# Patient Record
Sex: FEMALE | Race: WHITE
[De-identification: names, ages, dates, MRNs, and addresses within clinical notes are randomized per-mention and may not be internally consistent; named-entity substitution may affect disease eponyms.]

---

## 2019-08-01 NOTE — RS.OPPTDN
Subjective


Date of Note: 08/01/19


Visit #: 3


Number of visits approved by Insurance: na


Date of Evaluation: 07/25/19


Payer Source: Insurance


Treatment Diagnosis: open dislocation r ankle, R ankle pain, joint stiffness, 

joint effusion


Current Subjective/complaints:: Patient reports she is seeing improvement in 

strength and motion of the right ankle. States she continues to use the w/c 

when going out in public and at work.


*Precautions: WBAT with Cam boot, may remove boot for ex.





Pain Assessment





- Pain Description


Pain Location: right ankle, heel


Current Pain Intensity: mod with weight-bearing





- Treatment


Modality: Ultrasound


Parameters/Method Applied: [id number] at 1.5w/cm2 to the right ankle and along the 

distal achilles tendon.


Patient Position: Supine





- Heat/Cryotherapy


Treatment: Hot Pack ([id number] to the right foot and ankle prior to US and EX. )





Interventions





- Exercise/Activities/Manual Therapy


Exercises/Activities: Assisted ROM right ankle. Gentle end range stretching. 

Isometric right ankle DF, IV, and EV. Reviewed towel stretch. Soleus stretch 

with stool in sitting. Standing with walker for weight shifting and heel cord 

stretch.  Worked on weight-bearing and modified toe-up on right. In sitting, 

right FAQ and ham curl with yellow theraband. Wobble board with right foot.


Total minutes of Exercise: 24mins 


Manual Therapy: Retrograde massage to right foot and ankle.


Total minutes of Manual Therapy: 3mins 


HOME EXERCISE PROGRAM: pt given written HEP including isometric DF, PF, 

inversion, eversion as well as towel stretch. Steps stretch for soleus/heel 

cords. Standing heel cord stretch, knee flex, and hip abd.





- Charges


Timed Code Treatment Minutes: 39mins 


Total Treatment Time: 54mins 


Procedures billed for this date of service:: HP, US, EX2


Assessment: Patient doing better with standing, weight-bearing, and exercises 

today.


Patient Education: Body/Joint mechanics, Home Exercise Program, Home Safety


Patient demonstrates compliance with HEP?: Yes





Short Term Goals


Goal #1: pt independent with initial HEP


Goal to be met by: 08/15/19


Progress towards Goal:: Progressing


Goal #2: Decreased edema R ankle


Goal to be met by: 08/15/19


Progress towards Goal:: Progressing


Goal #3: pt amb in dept with walker with CAM boot WBAT CGA 50 ft


Goal to be met by: 08/15/19


Progress towards Goal:: Progressing


Goal #4: Improve R ankle ROM DF -5, PF 20, IV 20, eversion 15


Goal to be met by: 08/15/19





Long Term Goals


Goal #1: pt amb functional household distances with AAD independently with boot 

WBAT


Goal to be met by: 09/05/19


Goal #2: pt report pain < 4/10 with amb in R ankle


Goal to be met by: 09/05/19


Goal #3: Improve LE functional scale > 35


Goal to be met by: 09/05/19


Goal #4: R ankle strength improved to 4-/5


Goal to be met by: 09/05/19





Plan


Dates of Long Term Goals: 9/5/19


Expiration date of current Insurance Approval:: 9/5/19


PLAN: Continue modalities, manual therapy, and progress exercise to increase 

functional activity level.

## 2019-08-02 NOTE — RS.OPPTDN
Subjective


Date of Note: 08/02/19


Visit #: 4


Number of visits approved by Insurance: na


Date of Evaluation: 07/25/19


Payer Source: Insurance


Treatment Diagnosis: open dislocation r ankle, R ankle pain, joint stiffness, 

joint effusion


Current Subjective/complaints:: Patient reports right ankle seems less swollen 

and ROM is progressing. States she is working on putting more weight on the 

right LE when standing and walking.


*Precautions: WBAT with Cam boot, may remove boot for ex.





Pain Assessment





- Pain Description


Pain Location: right heel, ankle


Current Pain Intensity: mild to mod with WB, no pain at rest





- Treatment


Modality: Ultrasound


Parameters/Method Applied: [id number] at 1.5w/cm2 to the right ankle and along the 

achilles tendon.


Patient Position: Supine





- Heat/Cryotherapy


Treatment: Hot Pack ([id number] to the right foot/ankle prior to US and EX. 

Patient in supine. )





Interventions





- Exercise/Activities/Manual Therapy


Exercises/Activities: Assisted ROM right ankle. Gentle end range stretching. 

Isometric right ankle DF, IV, and EV. AROM right ankle, including circles cw 

and ccw. Soleus stretch with stool in sitting. Standing with walker for weight 

shifting and heel cord stretch. Forward weight shifting onto right forefoot, 

then onto right heel.  In sitting, wobble board for right ankle ROM.  In sitting

, added 3# to right ankle for resisted LAQ and hip flexion.


Total minutes of Exercise: 22mins 


Manual Therapy: Retrograde massage to right foot and ankle.


Total minutes of Manual Therapy: 15mins 


HOME EXERCISE PROGRAM: pt given written HEP including isometric DF, PF, 

inversion, eversion as well as towel stretch. Steps stretch for soleus/heel 

cords. Standing heel cord stretch, knee flex, and hip abd.





- Charges


Timed Code Treatment Minutes: 47mins 


Total Treatment Time: 62mins 


Procedures billed for this date of service:: HP, US, MT, EX


Assessment: Patient reporting consistent improvement with weight bearing.


Patient Education: Home Exercise Program


Patient demonstrates compliance with HEP?: Yes





Short Term Goals


Goal #1: pt independent with initial HEP


Goal to be met by: 08/15/19


Progress towards Goal:: Progressing


Goal #2: Decreased edema R ankle


Goal to be met by: 08/15/19


Progress towards Goal:: Progressing


Goal #3: pt amb in dept with walker with CAM boot WBAT CGA 50 ft


Goal to be met by: 08/15/19


Progress towards Goal:: Progressing


Goal #4: Improve R ankle ROM DF -5, PF 20, IV 20, eversion 15


Goal to be met by: 08/15/19





Long Term Goals


Goal #1: pt amb functional household distances with AAD independently with boot 

WBAT


Goal to be met by: 09/05/19


Goal #2: pt report pain < 4/10 with amb in R ankle


Goal to be met by: 09/05/19


Goal #3: Improve LE functional scale > 35


Goal to be met by: 09/05/19


Goal #4: R ankle strength improved to 4-/5


Goal to be met by: 09/05/19





Plan


Dates of Long Term Goals: 9/5/19


Expiration date of current Insurance Approval:: 9/5/19


PLAN: Continue modalities, manual therapy, and progressive exercise to increase 

functional ambulation.

## 2019-08-05 NOTE — RS.OPPTDN
Subjective


Date of Note: 08/05/19


Visit #: 5


Number of visits approved by Insurance: n/a


Date of Evaluation: 07/25/19


Payer Source: Insurance


Treatment Diagnosis: open dislocation r ankle, R ankle pain, joint stiffness, 

joint effusion


Current Subjective/complaints:: pt states that she feels like she is getting 

stronger since starting therapy. States she is able to be up walking in the 

house with rwx, but continues to use w/c for longer distances.


*Precautions: WBAT with Cam boot, may remove boot for ex.





Pain Assessment





- Pain Description


Pain Location: R ankle


Pain Description: Aching


Current Pain Intensity: 3





- Treatment


Modality: Ultrasound


Parameters/Method Applied: 1.5 w/cm2 x 10 mins


Treatment Area: R ankle and achilles


Patient Position: Supine





- Heat/Cryotherapy


Treatment: Hot Pack


Comments:: R ankle x 15 mins prior to ex





Interventions





- Exercise/Activities/Manual Therapy


Exercises/Activities: pt received passive stretching to R heel cord, Isometric 

R ankle DF/PF, Inversion/Eversion as well as ankle circles. pt worked on wobble 

board R ankle and performed LAQ, seated hip flex with 3# wt to R LE 2 sets of 

10 reps. pt also in standing worked on weight shifting forward and back as well 

as R to L.


Manual Therapy: Retrograde massage to right foot and ankle.


HOME EXERCISE PROGRAM: pt given written HEP including isometric DF, PF, 

inversion, eversion as well as towel stretch. Steps stretch for soleus/heel 

cords. Standing heel cord stretch, knee flex, and hip abd.





- Charges


Timed Code Treatment Minutes: 42


Total Treatment Time: 59


Procedures billed for this date of service:: hp, u/s, ex 2


Assessment: pt continues with edema R foot and lower leg. pt is improving with 

increased ability to weight bear as well as improved strength RLE.


Patient Education: Home Exercise Program, Education of Plan of Care


Patient demonstrates compliance with HEP?: Yes





Short Term Goals


Goal #1: pt independent with initial HEP


Goal to be met by: 08/15/19


Progress towards Goal:: Met


Goal #2: Decreased edema R ankle


Goal to be met by: 08/15/19


Progress towards Goal:: Progressing


Goal #3: pt amb in dept with walker with CAM boot WBAT CGA 50 ft


Goal to be met by: 08/15/19


Progress towards Goal:: Progressing


Goal #4: Improve R ankle ROM DF -5, PF 20, IV 20, eversion 15


Goal to be met by: 08/15/19


Progress towards Goal:: Progressing





Long Term Goals


Goal #1: pt amb functional household distances with AAD independently with boot 

WBAT


Goal to be met by: 09/05/19


Goal #2: pt report pain < 4/10 with amb in R ankle


Goal to be met by: 09/05/19


Goal #3: Improve LE functional scale > 35


Goal to be met by: 09/05/19


Goal #4: R ankle strength improved to 4-/5


Goal to be met by: 09/05/19





Plan


Dates of Long Term Goals: 9/5/19


Expiration date of current Insurance Approval:: 9/5/19


PLAN: plan to continue with ex for stretching, strengthening as well as 

modalities to improve R ankle ROM as well as functional mobility.

## 2019-08-08 NOTE — RS.OPPTDN
Subjective


Date of Note: 08/08/19


Visit #: 6


Number of visits approved by Insurance: na


Date of Evaluation: 07/25/19


Payer Source: Insurance


Treatment Diagnosis: open dislocation r ankle, R ankle pain, joint stiffness, 

joint effusion


Current Subjective/complaints:: Patient reports she was able to walk into 

[employer] and into a local restaurant with crutches. States she would like to 

progress to a cane.


*Precautions: WBAT with Cam boot, may remove boot for ex.





Pain Assessment





- Pain Description


Pain Location: right ankle


Current Pain Intensity: mild to mod with WB





- Treatment


Modality: Ultrasound


Parameters/Method Applied: [id number] to the right ankle and along the distal 

achilles tendon. Patient in supine.





- Heat/Cryotherapy


Treatment: Hot Pack ([id number] to the right ankle prior to US and EX. patient in 

supine. )





Interventions





- Exercise/Activities/Manual Therapy


Exercises/Activities: pt received passive stretching to right heel cord, 

Isometric right ankle DF, PF, Inv, and Eversion, ankle circles cw and ccw. 

Wobble board R ankle and performed LAQ, seated hip flex, and ham curl with 

green theraband. Standing weight shifting and instruction on proper use of 

cruthches and step sequence on stairs.


Total minutes of Exercise: 29mins 


Manual Therapy: na


HOME EXERCISE PROGRAM: pt given written HEP including isometric DF, PF, 

inversion, eversion as well as towel stretch. Steps stretch for soleus/heel 

cords. Standing heel cord stretch, knee flex, and hip abd.





- Charges


Timed Code Treatment Minutes: 39mins


Total Treatment Time: 54mins 


Procedures billed for this date of service:: HP, US, EX2


Assessment: Patient progressing with strengthening of the right LE and with 

ambulation with walker and crutches.


Patient Education: Body/Joint mechanics, Home Exercise Program


Patient demonstrates compliance with HEP?: Yes





Short Term Goals


Goal #1: pt independent with initial HEP


Goal to be met by: 08/15/19


Progress towards Goal:: Met


Goal #2: Decreased edema R ankle


Goal to be met by: 08/15/19


Progress towards Goal:: Progressing


Goal #3: pt amb in dept with walker with CAM boot WBAT CGA 50 ft


Goal to be met by: 08/15/19


Progress towards Goal:: Met


Goal #4: Improve R ankle ROM DF -5, PF 20, IV 20, eversion 15


Goal to be met by: 08/15/19


Progress towards Goal:: Progressing





Long Term Goals


Goal #1: pt amb functional household distances with AAD independently with boot 

WBAT


Goal to be met by: 09/05/19


Progress towards goal: Progressing


Goal #2: pt report pain < 4/10 with amb in R ankle


Goal to be met by: 09/05/19


Goal #3: Improve LE functional scale > 35


Goal to be met by: 09/05/19


Goal #4: R ankle strength improved to 4-/5


Goal to be met by: 09/05/19





Plan


Dates of Long Term Goals: 9/5/19


Expiration date of current Insurance Approval:: 9/5/19


PLAN: Progress with strength and ROM of the right LE to increase functional 

mobility.

## 2019-08-12 NOTE — RS.OPPTDN
Subjective


Date of Note: 08/12/19


Visit #: 8


Number of visits approved by Insurance: na


Date of Evaluation: 07/25/19


Payer Source: Insurance


Treatment Diagnosis: open dislocation r ankle, R ankle pain, joint stiffness, 

joint effusion


Current Subjective/complaints:: Reports increased soreness and tenderness right 

lateral heel as she has been up on her feet a lot at work today. Reports 

decreased pain and increased flexibility following treatment.


*Precautions: WBAT with Cam boot, may remove boot for ex.





Pain Assessment





- Pain Description


Pain Location: right ankle and heel


Pain Description: Dull, Aching


Pain Description: tenderness


Current Pain Intensity: moderate





- Treatment


Modality: Ultrasound


Parameters/Method Applied: [id number] at 1.5w/cm2 to the right ankle and along the 

distal achilles tendon.


Patient Position: Supine





Interventions





- Exercise/Activities/Manual Therapy


Exercises/Activities: Pt received passive stretching to right heel cord, 

Isometric right ankle DF, PF, Inv, and Eversion, ankle circles cw and ccw. 

Ended with additional long stretching of heel cords.  Patient walks into dept 

with crutches and is taken back to car in wheelchair.


Total minutes of Exercise: 15mins 


Manual Therapy: [id number] Directional massage through the right calf to reduce 

swelling in the right foot and ankle.


Total minutes of Manual Therapy: 19mins


HOME EXERCISE PROGRAM: pt given written HEP including isometric DF, PF, 

inversion, eversion as well as towel stretch. Steps stretch for soleus/heel 

cords. Standing heel cord stretch, knee flex, and hip abd.





- Charges


Timed Code Treatment Minutes: 46mins 


Total Treatment Time: 50mins 


Procedures billed for this date of service:: US, MT, EX 


Assessment: Patient increasing her functional activity level at home and at 

work. Patient has some increase discomfort today due to being up on her feet 

for longer period at work.


Patient Education: Body/Joint mechanics, Home Exercise Program


Patient demonstrates compliance with HEP?: Yes





Short Term Goals


Goal #1: pt independent with initial HEP


Goal to be met by: 08/15/19


Progress towards Goal:: Met


Goal #2: Decreased edema R ankle


Goal to be met by: 08/15/19


Progress towards Goal:: Progressing


Goal #3: pt amb in dept with walker with CAM boot WBAT CGA 50 ft


Goal to be met by: 08/15/19


Progress towards Goal:: Met


Goal #4: Improve R ankle ROM DF -5, PF 20, IV 20, eversion 15


Goal to be met by: 08/15/19


Progress towards Goal:: Partially Met





Long Term Goals


Goal #1: pt amb functional household distances with AAD independently with boot 

WBAT


Goal to be met by: 09/05/19


Progress towards goal: Met


Goal #2: pt report pain < 4/10 with amb in R ankle


Goal to be met by: 09/05/19


Progress towards goal: Partially Met


Goal #3: Improve LE functional scale > 35


Goal to be met by: 09/05/19


Goal #4: R ankle strength improved to 4-/5


Goal to be met by: 09/05/19


Progress towards goal: Progressing





Plan


Dates of Long Term Goals: 9/5/19


Expiration date of current Insurance Approval:: 9/5/19


PLAN: Continue modalities, manual therapy, and progressive exercise to increase 

functional ambulation and activity level.

## 2019-08-15 NOTE — RS.OPPTDN
Subjective


Date of Note: 08/15/19


Visit #: 9


Number of visits approved by Insurance: na


Date of Evaluation: 07/25/19


Payer Source: Insurance


Treatment Diagnosis: open dislocation r ankle, R ankle pain, joint stiffness, 

joint effusion


Current Subjective/complaints:: Patient reports she is pleased with increased 

ROM of the right ankle. She states that having US seems to help quite a lot.  

States she has stopped using the w/c at work and walking with crutches. States 

she uses the w/c to get around her home when she needs to rest the right LE.


*Precautions: WBAT with Cam boot, may remove boot for ex.





- Treatment


Modality: Ultrasound


Parameters/Method Applied: [id number] at 1.5w/cm2 to the right foot and ankle 

prior to EX.


Patient Position: Supine





Interventions





- Exercise/Activities/Manual Therapy


Exercises/Activities: Pt received passive stretching to right heel cord, 

Isometric right ankle DF, PF, Inv, and Eversion, ankle circles cw and ccw.  

Patient walks into dept with crutches and is able to walk back to car following 

treatment. Gait is slow but she actively works on right heel strike and push/toe

-off.


Total minutes of Exercise: 16misn 


Manual Therapy: x5mins Directional massage to the right foot and ankle.


Total minutes of Manual Therapy: 5mins 


HOME EXERCISE PROGRAM: pt given written HEP including isometric DF, PF, 

inversion, eversion as well as towel stretch. Steps stretch for soleus/heel 

cords. Standing heel cord stretch, knee flex, and hip abd.





- Objective Findings


Observations,measurements,etc.: Patient able to demo 4 degrees active right 

ankle df, with approx 15 degrees of total motion with right ankle df/pf.





- Charges


Timed Code Treatment Minutes: 33mins


Total Treatment Time: 37mins 


Procedures billed for this date of service:: US, EX


Assessment: Patient progressing well with functional ambulation with crutches 

at work and home. She also demos increased ROM of the right ankle.


Patient Education: Home Exercise Program, Home Safety, Activity Modification


Patient demonstrates compliance with HEP?: Yes





Short Term Goals


Goal #1: pt independent with initial HEP


Goal to be met by: 08/15/19


Progress towards Goal:: Met


Goal #2: Decreased edema R ankle


Goal to be met by: 08/15/19


Progress towards Goal:: Progressing


Goal #3: pt amb in dept with walker with CAM boot WBAT CGA 50 ft


Goal to be met by: 08/15/19


Progress towards Goal:: Met


Goal #4: Improve R ankle ROM DF -5, PF 20, IV 20, eversion 15


Goal to be met by: 08/15/19


Progress towards Goal:: Partially Met





Long Term Goals


Goal #1: pt amb functional household distances with AAD independently with boot 

WBAT


Goal to be met by: 09/05/19


Progress towards goal: Met


Goal #2: pt report pain < 4/10 with amb in R ankle


Goal to be met by: 09/05/19


Progress towards goal: Partially Met


Goal #3: Improve LE functional scale > 35


Goal to be met by: 09/05/19


Goal #4: R ankle strength improved to 4-/5


Goal to be met by: 09/05/19


Progress towards goal: Progressing





Plan


Dates of Long Term Goals: 9/5/19


Expiration date of current Insurance Approval:: 9/5/19


PLAN: Continue modalities and progress exercise to increase strength, ROM, and 

functional activity level.

## 2019-08-16 NOTE — RS.CXNS
Date of scheduled appointment: 08/16/19


Type: Cancel (Patient cancelled appointment today due to having an tooth abcess)

## 2019-08-19 NOTE — RS.OPPTDN
Subjective


Date of Note: 08/19/19


Visit #: 10


Number of visits approved by Insurance: na


Date of Evaluation: 07/25/19


Payer Source: Insurance


Treatment Diagnosis: open dislocation r ankle, R ankle pain, joint stiffness, 

joint effusion


Current Subjective/complaints:: Patient reports she did very little standing 

for 2 days over the weekend. States she noticed a significant decrease in right 

foot and ankle swelling.


*Precautions: WBAT with Cam boot, may remove boot for ex.





Pain Assessment





- Pain Description


Pain Location: right foot and ankle


Pain Description: Tightness, Aching


Current Pain Intensity: mild 





- Treatment


Modality: Ultrasound


Parameters/Method Applied: [id number] at 1.5w/cm2 to the right ankle with focus on 

lateral ankle and along achilles tendon.


Patient Position: Supine





Interventions





- Exercise/Activities/Manual Therapy


Exercises/Activities: Pt received passive stretching to right heel cord, 

isometric right ankle DF, PF, Inv, and Eversion, ankle circles cw and ccw.  In 

sitting, wobble board for ROM and passive stretching right heelcord.


Total minutes of Exercise: 14mins 


Manual Therapy: 14mins Directional massage to the right foot and ankle.


Total minutes of Manual Therapy: 14mins 


HOME EXERCISE PROGRAM: pt given written HEP including isometric DF, PF, 

inversion, eversion as well as towel stretch. Steps stretch for soleus/heel 

cords. Standing heel cord stretch, knee flex, and hip abd.





- Charges


Timed Code Treatment Minutes: 40mins 


Total Treatment Time: 43mins 


Procedures billed for this date of service:: US, MT, EX


Assessment: Patient reporting progress with swelling, ROM, and ambulation.


Patient Education: Body/Joint mechanics, Home Exercise Program


Patient demonstrates compliance with HEP?: Yes





Short Term Goals


Goal #1: pt independent with initial HEP


Goal to be met by: 08/15/19


Progress towards Goal:: Met


Goal #2: Decreased edema R ankle


Goal to be met by: 08/15/19


Progress towards Goal:: Progressing


Goal #3: pt amb in dept with walker with CAM boot WBAT CGA 50 ft


Goal to be met by: 08/15/19


Progress towards Goal:: Met


Goal #4: Improve R ankle ROM DF -5, PF 20, IV 20, eversion 15


Goal to be met by: 08/15/19


Progress towards Goal:: Partially Met





Long Term Goals


Goal #1: pt amb functional household distances with AAD independently with boot 

WBAT


Goal to be met by: 09/05/19


Progress towards goal: Met


Goal #2: pt report pain < 4/10 with amb in R ankle


Goal to be met by: 09/05/19


Progress towards goal: Partially Met


Goal #3: Improve LE functional scale > 35


Goal to be met by: 09/05/19


Goal #4: R ankle strength improved to 4-/5


Goal to be met by: 09/05/19


Progress towards goal: Progressing





Plan


Dates of Long Term Goals: 9/5/19


Expiration date of current Insurance Approval:: 9/5/19


PLAN: Continue modalities and progress exercise to increase functional 

ambulation and activity level.

## 2019-08-22 NOTE — RS.OPPTDN
Subjective


Date of Note: 08/22/19


Visit #: 11


Number of visits approved by Insurance: na


Date of Evaluation: 07/25/19


Payer Source: Insurance


Treatment Diagnosis: open dislocation r ankle, R ankle pain, joint stiffness, 

joint effusion


Current Subjective/complaints:: Patient reports she has progressed to one 

crutch with ambulation. She also reports trying to stand and perform light 

weight shift onto right LE.


*Precautions: WBAT with Cam boot, may remove boot for ex.





- Treatment


Modality: Ultrasound


Parameters/Method Applied: [id number] at 1.5w/cm2 to the right ankle and along 

both sides of the achilles tendon.


Patient Position: Supine





Interventions





- Exercise/Activities/Manual Therapy


Exercises/Activities: Pt received passive stretching to right heel cord, 

isometric right ankle DF, PF, Inv, and Eversion, ankle circles cw and ccw. Red 

theraband for resisted right ankle df, pf, inversion, and eversion.   In sitting

, right heelcord stretch.  Standing weight shift on right. Modified mini-squat.

  Forward step on right for modified heel strike. Back step for weight bearing 

on forefoot for toe-off.  Walked in hallway and given verbal cues for heel 

strike and toe-off on the right LE, using crutch on left.


Total minutes of Exercise: 18mins 


Manual Therapy: 10mins Directional massage to the right foot and ankle with 

long stretching of plantar fascia and heel cord.


Total minutes of Manual Therapy: 10mins 


HOME EXERCISE PROGRAM: pt given written HEP including isometric DF, PF, 

inversion, eversion as well as towel stretch. Steps stretch for soleus/heel 

cords. Standing heel cord stretch, knee flex, and hip abd.





- Charges


Timed Code Treatment Minutes: 42mins 


Total Treatment Time: 45mins 


Procedures billed for this date of service:: US, MT, EX


Assessment: Patient progressing with strength, ROM, and gait pattern.


Patient Education: Home Exercise Program, Home Safety, Activity Modification


Patient demonstrates compliance with HEP?: Yes





Short Term Goals


Goal #1: pt independent with initial HEP


Goal to be met by: 08/15/19


Progress towards Goal:: Met


Goal #2: Decreased edema R ankle


Goal to be met by: 08/15/19


Progress towards Goal:: Progressing


Goal #3: pt amb in dept with walker with CAM boot WBAT CGA 50 ft


Goal to be met by: 08/15/19


Progress towards Goal:: Met


Goal #4: Improve R ankle ROM DF -5, PF 20, IV 20, eversion 15


Goal to be met by: 08/15/19


Progress towards Goal:: Partially Met





Long Term Goals


Goal #1: pt amb functional household distances with AAD independently with boot 

WBAT


Goal to be met by: 09/05/19


Progress towards goal: Met


Goal #2: pt report pain < 4/10 with amb in R ankle


Goal to be met by: 09/05/19


Progress towards goal: Partially Met


Goal #3: Improve LE functional scale > 35


Goal to be met by: 09/05/19


Goal #4: R ankle strength improved to 4-/5


Goal to be met by: 09/05/19


Progress towards goal: Progressing





Plan


Dates of Long Term Goals: 9/5/19


Expiration date of current Insurance Approval:: 9/5/19


PLAN: Progress with strength and ROM of the right LE to increase functional 

activity level and return to PLOF.

## 2019-08-23 NOTE — RS.OPPTDN
Subjective


Date of Note: 08/23/19


Visit #: 12


Number of visits approved by Insurance: na


Date of Evaluation: 07/25/19


Payer Source: Insurance


Treatment Diagnosis: open dislocation r ankle, R ankle pain, joint stiffness, 

joint effusion


Current Subjective/complaints:: Patient reports doing better walking with one 

crutch. She states she performed standing with walker and attempted to increase 

weight bearing on the right foot/ankle without boot.


*Precautions: WBAT with Cam boot, may remove boot for ex.





Pain Assessment





- Pain Description


Pain Location: right ankle


Pain Description: Tightness


Current Pain Intensity: mild discomfort with weight-bearing





- Treatment


Modality: Ultrasound


Parameters/Method Applied: [id number] at 1.5w/cm2 to the right ankle and along the 

achilles tendon.


Patient Position: Supine





Interventions





- Exercise/Activities/Manual Therapy


Exercises/Activities: Passive stretching to right heel cord, isometric right 

ankle DF, PF, Inv, and Eversion, ankle circles cw and ccw. Red theraband for 

resisted right ankle df, pf, inversion, and eversion.   In sitting, right 

heelcord stretch.  3# for LAQ and Hip flex. Standing weight shift onto right. 

Standing with walker for modified toe-ups, mini-squat, forward weight shift 

onto heel and push off of ball of right foot.   Walked in department with 

instruction for proper use of crutch on left side.


Total minutes of Exercise: 23mins


Manual Therapy: 5mins Directional massage to the right foot and ankle.


Total minutes of Manual Therapy: 5mins 


HOME EXERCISE PROGRAM: pt given written HEP including isometric DF, PF, 

inversion, eversion as well as towel stretch. Steps stretch for soleus/heel 

cords. Standing heel cord stretch, knee flex, and hip abd.





- Objective Findings


Observations,measurements,etc.: Active right df 2 to 3 degrees, with total 

motion at approx 21 degrees.





- Charges


Timed Code Treatment Minutes: 40mins 


Total Treatment Time: 44mins 


Procedures billed for this date of service:: US, EX2


Assessment: Patient progressing well with ambulation and working on increased 

weight bearing on right LE.


Patient Education: Home Exercise Program


Patient demonstrates compliance with HEP?: Yes





Short Term Goals


Goal #1: pt independent with initial HEP


Goal to be met by: 08/15/19


Progress towards Goal:: Met


Goal #2: Decreased edema R ankle


Goal to be met by: 08/15/19


Progress towards Goal:: Progressing


Goal #3: pt amb in dept with walker with CAM boot WBAT CGA 50 ft


Goal to be met by: 08/15/19


Progress towards Goal:: Met


Goal #4: Improve R ankle ROM DF -5, PF 20, IV 20, eversion 15


Goal to be met by: 08/15/19


Progress towards Goal:: Partially Met





Long Term Goals


Goal #1: pt amb functional household distances with AAD independently with boot 

WBAT


Goal to be met by: 09/05/19


Progress towards goal: Met


Goal #2: pt report pain < 4/10 with amb in R ankle


Goal to be met by: 09/05/19


Progress towards goal: Partially Met


Goal #3: Improve LE functional scale > 35


Goal to be met by: 09/05/19


Goal #4: R ankle strength improved to 4-/5


Goal to be met by: 09/05/19


Progress towards goal: Progressing





Plan


Dates of Long Term Goals: 9/5/19


Expiration date of current Insurance Approval:: 9/5/19


PLAN: Progress with ROM and strengthening to increase functional ambulation and 

activity level.

## 2019-08-26 NOTE — RS.OPPTDN
Subjective


Date of Note: 08/26/19


Visit #: 13


Number of visits approved by Insurance: 18


Date of Evaluation: 07/25/19


Payer Source: Insurance


Treatment Diagnosis: open dislocation r ankle, R ankle pain, joint stiffness, 

joint effusion


Current Subjective/complaints:: Patient says she has had mild pain to the R 

heel.  Reports swelling seems to be decreasing slowly and states she elevates, 

ices, and  massages at home.


*Precautions: WBAT with Cam boot, may remove boot for ex.





- Treatment


Modality: Ultrasound


Parameters/Method Applied: continous @ 1.5 w/cm2 x 12 mins to the R lateral 

malleoli region and to the heel


Patient Position: Supine





Interventions





- Exercise/Activities/Manual Therapy


Exercises/Activities: Passive stretching to right heel cord, isometric right 

ankle DF, PF, Inv, and Eversion, ankle circles cw and ccw. Red theraband for 

resisted right ankle df, pf, inversion, and eversion.   In sitting, right 

heelcord stretch.  3# for LAQ and Hip flex. Standing weight shift onto right. 

Standing at bed for modified toe-ups, limited range mini-squat, forward weight 

shift onto heel and push off of ball of right foot.   Walked in department with 

instruction for proper use of crutch on left side.


Total minutes of Exercise: 28


Manual Therapy: na


HOME EXERCISE PROGRAM: pt given written HEP including isometric DF, PF, 

inversion, eversion as well as towel stretch. Steps stretch for soleus/heel 

cords. Standing heel cord stretch, knee flex, and hip abd.





- Charges


Timed Code Treatment Minutes: 40


Total Treatment Time: 40


Procedures billed for this date of service:: u/s, ex2


Assessment: Patient improving with being able to apply increased weight to the 

R LE.  She is feeling more confident about her bal and gait while using 1 

crutch.  She is able to ascend/descend multiple stairs at her daughters apt 

over the weekend with supervison, but is not comfortable doing this without 

someone close by.  She is compliant with HEP and demo 4/5 strength for R ankle.


Patient Education: Home Exercise Program


Patient demonstrates compliance with HEP?: Yes





Short Term Goals


Goal #1: pt independent with initial HEP


Goal to be met by: 08/15/19


Progress towards Goal:: Met


Goal #2: Decreased edema R ankle


Goal to be met by: 08/15/19


Progress towards Goal:: Progressing


Goal #3: pt amb in dept with walker with CAM boot WBAT CGA 50 ft


Goal to be met by: 08/15/19


Progress towards Goal:: Met


Goal #4: Improve R ankle ROM DF -5, PF 20, IV 20, eversion 15


Goal to be met by: 08/15/19


Progress towards Goal:: Partially Met





Long Term Goals


Goal #1: pt amb functional household distances with AAD independently with boot 

WBAT


Goal to be met by: 09/05/19


Progress towards goal: Met


Goal #2: pt report pain < 4/10 with amb in R ankle


Goal to be met by: 09/05/19


Progress towards goal: Partially Met


Goal #3: Improve LE functional scale > 35


Goal to be met by: 09/05/19


Goal #4: R ankle strength improved to 4-/5


Goal to be met by: 09/05/19


Progress towards goal: Progressing





Plan


Dates of Long Term Goals: 9/5/19


Expiration date of current Insurance Approval:: 9/5/19


PLAN: Patient to continue TIW for modalities and therex to build flexibility 

and strength to the R LE progressing gait.

## 2019-08-30 ENCOUNTER — HOSPITAL ENCOUNTER (OUTPATIENT)
Dept: HOSPITAL 58 - REHAB | Age: 49
LOS: 1 days | End: 2019-08-31
Attending: ORTHOPAEDIC SURGERY

## 2019-08-30 DIAGNOSIS — S91.001D: ICD-10-CM

## 2019-08-30 DIAGNOSIS — S93.04XD: Primary | ICD-10-CM

## 2019-08-30 NOTE — RS.OPPTDN
Subjective


Date of Note: 08/30/19


Visit #: 14


Number of visits approved by Insurance: na


Date of Evaluation: 07/25/19


Payer Source: Insurance


Treatment Diagnosis: open dislocation r ankle, R ankle pain, joint stiffness, 

joint effusion


Current Subjective/complaints:: Patient reports putting weight on right LE and 

taking a few steps, using walker but no cam boot, at home. States she can see 

definite progress with strength and ROM of the right foot/ankle, although it is 

slow.


*Precautions: WBAT with Cam boot, may remove boot for ex.





Pain Assessment





- Pain Description


Pain Location: right foot and ankle


Current Pain Intensity: mild 





- Treatment


Modality: Ultrasound


Parameters/Method Applied: [id number] at 1.5w/cm2 to the right lateral ankle and 

along both sides of the achilles tendon.


Patient Position: Supine





Interventions





- Exercise/Activities/Manual Therapy


Exercises/Activities: Passive stretching to right heel cord, isometric right 

ankle DF, PF, Inv, and Eversion. Red theraband for resisted right ankle df, pf, 

inversion, and eversion.  SAQ 3# and ham sets.  In sitting, right heelcord 

stretch.  3# for LAQ and Hip flex. Standing weight shift onto right. Modified 

toe-ups, limited range mini-squat, forward weight shift onto heel and push off 

of ball of right foot, all with walker ABS and no cam boot.   Walked in 

department with instruction to increase heel strike/toe-off, with walker and no 

cam boot. Leg press with 30# [id number], then 30# for ankle df/pf 20reps.


Total minutes of Exercise: 31mins 


Manual Therapy: na


HOME EXERCISE PROGRAM: pt given written HEP including isometric DF, PF, 

inversion, eversion as well as towel stretch. Steps stretch for soleus/heel 

cords. Standing heel cord stretch, knee flex, and hip abd.





- Objective Findings


Observations,measurements,etc.: Demos active right df bewtween 1 and 2 degrees, 

with ROM being approx 17 degrees.





- Charges


Timed Code Treatment Minutes: 43mins 


Total Treatment Time: 44mins 


Procedures billed for this date of service:: US, EX2


Assessment: Pt progressing with strengthening, ROM, and gait training.


Patient Education: Home Exercise Program, Home Safety, Activity Modification


Patient demonstrates compliance with HEP?: Yes





Short Term Goals


Goal #1: pt independent with initial HEP


Goal to be met by: 08/15/19


Progress towards Goal:: Met


Goal #2: Decreased edema R ankle


Goal to be met by: 08/15/19


Progress towards Goal:: Progressing


Goal #3: pt amb in dept with walker with CAM boot WBAT CGA 50 ft


Goal to be met by: 08/15/19


Progress towards Goal:: Met


Goal #4: Improve R ankle ROM DF -5, PF 20, IV 20, eversion 15


Goal to be met by: 08/15/19


Progress towards Goal:: Partially Met





Long Term Goals


Goal #1: pt amb functional household distances with AAD independently with boot 

WBAT


Goal to be met by: 09/05/19


Progress towards goal: Met


Goal #2: pt report pain < 4/10 with amb in R ankle


Goal to be met by: 09/05/19


Progress towards goal: Partially Met


Goal #3: Improve LE functional scale > 35


Goal to be met by: 09/05/19


Goal #4: R ankle strength improved to 4-/5


Goal to be met by: 09/05/19


Progress towards goal: Progressing





Plan


Dates of Long Term Goals: 9/5/19


Expiration date of current Insurance Approval:: 9/5/19


PLAN: Progress with ROM and strengthening to improve gait and functional 

activity level.

## 2023-11-22 NOTE — RS.OPPTDN
Subjective


Date of Note: 08/09/19


Visit #: 7


Number of visits approved by Insurance: na


Date of Evaluation: 07/25/19


Payer Source: Insurance


Treatment Diagnosis: open dislocation r ankle, R ankle pain, joint stiffness, 

joint effusion


Current Subjective/complaints:: Patient states she is walking better and feels 

treatment is helping with right ankle ROM.


*Precautions: WBAT with Cam boot, may remove boot for ex.





Pain Assessment





- Pain Description


Pain Location: right ankle


Pain Description: stiffness


Current Pain Intensity: mild with weight-bearing. 





- Treatment


Modality: Ultrasound


Parameters/Method Applied: [id number] to the right ankle at 1.5w/cm2


Patient Position: Supine





Interventions





- Exercise/Activities/Manual Therapy


Exercises/Activities: Pt received passive stretching to right heel cord, 

Isometric right ankle DF, PF, Inv, and Eversion, ankle circles cw and ccw. 

Wobble board R ankle and performed LAQ, seated hip flex, and ham curl with 

green theraband.  Works on step with crutches, patient education of safety with 

crutches on steps.  Patient walks into dept with crutches and back to car.


Total minutes of Exercise: 37mins 


Manual Therapy: na


HOME EXERCISE PROGRAM: pt given written HEP including isometric DF, PF, 

inversion, eversion as well as towel stretch. Steps stretch for soleus/heel 

cords. Standing heel cord stretch, knee flex, and hip abd.





- Objective Findings


Observations,measurements,etc.: Active right ankle df to neutral





- Charges


Timed Code Treatment Minutes: 49mins 


Total Treatment Time: 55mins 


Procedures billed for this date of service:: US, EX2


Assessment: Pt progressing with amb with crutches and ROM of the right ankle.


Patient Education: Body/Joint mechanics, Home Exercise Program, Home Safety, 

Activity Modification


Comments: Focus of education is safety with crutches and steps.


Patient demonstrates compliance with HEP?: Yes





Short Term Goals


Goal #1: pt independent with initial HEP


Goal to be met by: 08/15/19


Progress towards Goal:: Met


Goal #2: Decreased edema R ankle


Goal to be met by: 08/15/19


Progress towards Goal:: Progressing


Goal #3: pt amb in dept with walker with CAM boot WBAT CGA 50 ft


Goal to be met by: 08/15/19


Progress towards Goal:: Met


Goal #4: Improve R ankle ROM DF -5, PF 20, IV 20, eversion 15


Goal to be met by: 08/15/19


Progress towards Goal:: Partially Met





Long Term Goals


Goal #1: pt amb functional household distances with AAD independently with boot 

WBAT


Goal to be met by: 09/05/19


Progress towards goal: Met


Goal #2: pt report pain < 4/10 with amb in R ankle


Goal to be met by: 09/05/19


Progress towards goal: Partially Met


Goal #3: Improve LE functional scale > 35


Goal to be met by: 09/05/19


Goal #4: R ankle strength improved to 4-/5


Goal to be met by: 09/05/19


Progress towards goal: Progressing





Plan


Dates of Long Term Goals: 9/5/19


Expiration date of current Insurance Approval:: 9/5/19


PLAN: Progress with strength and ROM. What Type Of Note Output Would You Prefer (Optional)?: Bullet Format How Severe Is Your Skin Lesion?: mild Is This A New Presentation, Or A Follow-Up?: Growth Additional History: Patient states bump may feel like a pimple